# Patient Record
(demographics unavailable — no encounter records)

---

## 2024-10-08 NOTE — PROCEDURE
[de-identified] : Acupuncture treatment: Baldry technique with multiple needle placement to the lumbar paraspinal and gluteal musculature with UV lamp x 60 minutes Paralumbar chain (bladder meridian) with ES x 60 minutes K3-Ht 3 SI 4 BL 60 with ES x 60 minutes BL 10, SP 6, ST 36, LI 3, GB 21, LR 4 BL 59 Patient tolerated procedure well.

## 2024-11-13 NOTE — HISTORY OF PRESENT ILLNESS
[FreeTextEntry1] : Patient reports that he continues to experience cervical spine pain as well as lumbar pain.  He reports cervical spine pain is primarily right-sided.  He continues to experience radicular symptoms involving his left lower extremity.  Patient informs me he is scheduled to undergo a radiofrequency ablation involving his right cervical facets next week.  Electrodiagnostic studies of the lower extremities have been performed.  Patient also reports that he continues to find acupuncture treatments beneficial in providing some degree of pain relief and allows for decreased use of pharmaceutical agents.  He reports using Aleve as needed. [Has the patient missed work because of the injury/illness?] : The patient has missed work because of the injury/illness. [Yes] : The patient is currently working.

## 2024-11-13 NOTE — ASSESSMENT
[FreeTextEntry1] : Patient recently received authorization for acupuncture to the C/S and L/S Follow-up with pain management for right cervical facet ablation Discussed the importance of home exercises program, including but not limited to neck and low back range of motion, stretching and strengthening exercises and core strengthening. Patient was educated on their diagnosis today. All questions answered and patient expressed understanding.  F/U PM Recheck 4 to 6 weeks.   At least 20 minutes was spent with patient face to face examining patient, reviewing findings/results, counseling patient and coordinating treatment program. Ample time was provided to answer any questions or address concerns to the patient's satisfaction.  [Indicate if, in your opinion, the incident that the patient described was the competent medical cause of this injury/illness.] : The incident that the patient described was the competent medical cause of this injury/illness: Yes [Indicate if the patient's complaints are consistent with his/her history of the injury/illness.] : Indicate if the patient's complaints are consistent with his/her history of the injury/illness: Yes [Yes] : Yes, it is consistent [Can the patient return to usual work activities as indicated? If yes, indicate date___] : The patient can return to usual work activities on [unfilled] [FreeTextEntry5] : 75

## 2024-11-13 NOTE — PHYSICAL EXAM
[FreeTextEntry1] : Examination of the Lumbar Spine  MMT L/E: Intact SLR positive 40 degrees on the right, SLR positive 50 degrees on the left Reflexes 2 and symmetrical   Flexion: 45 degrees Extension: 15 degrees Lateral Bend R 25 degrees  L 30 degrees  Palpation lumbar spine: Tenderness and ,moderate spasm involving the bilateral lower lumbar paraspinal musculature trigger points gluteal musculature   Examination of the Cervical Spine  Flexion 40 degrees Extension 15 degrees  Rotation R 50 degrees    L 55 degrees Lateral Bend R  20 degrees L 30 degrees  MMT grossly intact  Reflexes: uppers 2 and symmetrical.   Palpation of the cervical Spine: tenderness and spasm bilateral trapezii and cervical paraspinal musculature with greater involvement on the right tenderness involving the right C4-C5 and C6 cervical facet region. [Normal] : The posterior cervical, anterior cervical, supraclavicular, axillary, femoral and inguinal nodes were non-tender and normal size

## 2025-01-09 NOTE — PROCEDURE
[de-identified] : Acupuncture treatment: Baldry technique with multiple needle placement to the cervical paraspinal and parascapular musculature with UV lamp x 60 minutes Cervical chain (bladder meridian) with ES x 60 minutes K3-Ht 3 SI 4 BL 60 with ES x 60 minutes BL 10, SP 6, ST 36, LI 3, GB 21, LR 4 BL 59 Patient tolerated procedure well.

## 2025-01-17 NOTE — PROCEDURE
[de-identified] : Acupuncture treatment: Baldry technique with multiple needle placement to the cervical paraspinal and parascapular musculature with UV lamp x 60 minutes Cervical chain (bladder meridian) with ES x 60 minutes K3-Ht 3 SI 4 BL 60 with ES x 60 minutes BL 10, SP 6, ST 36, LI 3, GB 21, LR 4 BL 59 Patient tolerated procedure well.

## 2025-01-30 NOTE — PROCEDURE
[de-identified] : Acupuncture treatment: Baldry technique with multiple needle placement to the cervical paraspinal and parascapular musculature with UV lamp x 60 minutes Cervical chain (bladder meridian) with ES x 60 minutes K3-Ht 3 SI 4 BL 60 with ES x 60 minutes BL 10, SP 6, ST 36, LI 3, GB 21, LR 4 BL 59 Patient tolerated procedure well.

## 2025-02-05 NOTE — HISTORY OF PRESENT ILLNESS
[FreeTextEntry1] : Patient continues to complain of C-spine pain with intermittent radicular symptoms involving his bilateral upper extremities he continues to report low back pain with intermittent radicular symptoms involving his left lower extremity.  Patient informs of that he is scheduled to undergo MMB injections to his lumbar spine next week.  Electrodiagnostic studies were reviewed with the patient.  Patient reports he continues to take  naproxen and Tylenol as per pain management [Has the patient missed work because of the injury/illness?] : The patient has missed work because of the injury/illness. [Yes] : The patient is currently working.

## 2025-02-05 NOTE — PHYSICAL EXAM
[FreeTextEntry1] : Examination of the Lumbar Spine  MMT L/E: Intact SLR positive 40 degrees on the right, SLR positive 40 degrees on the left Reflexes 2 and symmetrical   Flexion: 50 degrees Extension: 15 degrees Lateral Bend R 25 degrees  L 30 degrees  Palpation lumbar spine: Tenderness and, moderate spasm involving the bilateral lower lumbar paraspinal musculature trigger points gluteal musculature  Examination of the Cervical Spine  Flexion 40 degrees Extension 20 degrees  Rotation R 50 degrees    L 60 degrees Lateral Bend R  25 degrees L 30 degrees  MMT grossly intact  Reflexes: uppers 2 and symmetrical.   Palpation of the cervical Spine: tenderness and spasm bilateral trapezii and cervical paraspinal musculature. tenderness involving the right C4-C5 and C6 cervical facet region. [Normal] : The posterior cervical, anterior cervical, supraclavicular, axillary, femoral and inguinal nodes were non-tender and normal size

## 2025-02-06 NOTE — PROCEDURE
[de-identified] : Acupuncture treatment: Baldry technique with multiple needle placement to the cervical paraspinal and parascapular musculature with UV lamp x 60 minutes Cervical chain (bladder meridian) with ES x 60 minutes K3-Ht 3 SI 4 BL 60 with ES x 60 minutes BL 10, SP 6, ST 36, LI 3, GB 21, LR 4 BL 59 Patient tolerated procedure well.

## 2025-02-21 NOTE — PROCEDURE
[de-identified] : Acupuncture treatment: Baldry technique with multiple needle placement to the cervical paraspinal and parascapular musculature with UV lamp x 60 minutes Cervical chain (bladder meridian) with ES x 60 minutes K3-Ht 3 SI 4 BL 60 with ES x 60 minutes BL 10, SP 6, ST 36, LI 3, GB 21, LR 4 BL 59 Patient tolerated procedure well.

## 2025-02-25 NOTE — PROCEDURE
[de-identified] : Acupuncture treatment: Baldry technique with multiple needle placement to the cervical paraspinal and parascapular musculature with UV lamp x 60 minutes Cervical chain (bladder meridian) with ES x 60 minutes K3-Ht 3 SI 4 BL 60 with ES x 60 minutes BL 10, SP 6, ST 36, LI 3, GB 21, LR 4 BL 59 Patient tolerated procedure well.

## 2025-03-03 NOTE — PHYSICAL EXAM
[FreeTextEntry1] : Examination of the Lumbar Spine  MMT L/E: Intact SLR positive 50 degrees on the right, SLR positive 30 degrees on the left Reflexes 2 and symmetrical   Flexion: 50 degrees Extension: 20 degrees Lateral Bend R 25 degrees  L 25 degrees  Palpation lumbar spine: Tenderness and, moderate spasm involving the bilateral lower lumbar paraspinal musculature trigger points gluteal musculature  Examination of the Cervical Spine  Flexion 40 degrees Extension 25 degrees  Rotation R 55 degrees    L 60 degrees Lateral Bend R  25 degrees L 30 degrees  MMT grossly intact  Reflexes: uppers 2 and symmetrical.   Palpation of the cervical Spine: Tenderness and spasm involving the bilateral cervical paraspinal musculature trigger points involving bilateral trapezii. [Normal] : The posterior cervical, anterior cervical, supraclavicular, axillary, femoral and inguinal nodes were non-tender and normal size

## 2025-03-03 NOTE — HISTORY OF PRESENT ILLNESS
[FreeTextEntry1] : Patient reports he continues to experience C-spine pain with intermittent radicular symptoms involving bilateral upper extremities as well as low back pain with intermittent radicular symptoms involving his left lower extremity.  Patient reports he underwent a MMB injection to the bilateral lower lumbar paraspinal musculature approximately 3 weeks ago which provided some temporary relief.  He informs me that he is scheduled for follow-up with pain management to discuss possible radiofrequency ablation.  Patient reports he continues to find acupuncture treatments beneficial in terms of alleviating pain and dissipating muscle spasm presents today for reevaluation. [Has the patient missed work because of the injury/illness?] : The patient has missed work because of the injury/illness. [Yes] : The patient is currently working.

## 2025-03-03 NOTE — ASSESSMENT
[FreeTextEntry1] : Continue with acupuncture to the C/S and L/S Follow-up with pain management  Discussed the importance of home exercises program, including but not limited to neck and low back range of motion, stretching and strengthening exercises and core strengthening. Patient was educated on their diagnosis today. All questions answered and patient expressed understanding.  Reviewed home exercise program with patient.  Stressed the importance of cervical spine range of motion trap and rhomboid stretching scapular mobilization isometrics to the C-spine, proper posturing and body mechanics as well as ergonomics  Recheck 4 to 6 weeks.   At least 20 minutes was spent with patient face to face examining patient, reviewing findings/results, counseling patient and coordinating treatment program. Ample time was provided to answer any questions or address concerns to the patient's satisfaction.  [Indicate if, in your opinion, the incident that the patient described was the competent medical cause of this injury/illness.] : The incident that the patient described was the competent medical cause of this injury/illness: Yes [Indicate if the patient's complaints are consistent with his/her history of the injury/illness.] : Indicate if the patient's complaints are consistent with his/her history of the injury/illness: Yes [Yes] : Yes, it is consistent [Can the patient return to usual work activities as indicated? If yes, indicate date___] : The patient can return to usual work activities on [unfilled] [FreeTextEntry5] : 75

## 2025-03-14 NOTE — PROCEDURE
[de-identified] : Acupuncture treatment: Baldry technique with multiple needle placement to the cervical paraspinal and parascapular musculature with UV lamp x 60 minutes Cervical chain (bladder meridian) with ES x 60 minutes K3-Ht 3 SI 4 BL 60 with ES x 60 minutes BL 10, SP 6, ST 36, LI 3, GB 21, LR 4 BL 59 Patient tolerated procedure well.

## 2025-03-14 NOTE — PROCEDURE
[de-identified] : Acupuncture treatment: Baldry technique with multiple needle placement to the cervical paraspinal and parascapular musculature with UV lamp x 60 minutes Cervical chain (bladder meridian) with ES x 60 minutes K3-Ht 3 SI 4 BL 60 with ES x 60 minutes BL 10, SP 6, ST 36, LI 3, GB 21, LR 4 BL 59 Patient tolerated procedure well.

## 2025-03-28 NOTE — PROCEDURE
[de-identified] : Acupuncture treatment: Baldry technique with multiple needle placement to the cervical paraspinal and parascapular musculature with UV lamp x 60 minutes Cervical chain (bladder meridian) with ES x 60 minutes K3-Ht 3 SI 4 BL 60 with ES x 60 minutes BL 10, SP 6, ST 36, LI 3, GB 21, LR 4 BL 59 Patient tolerated procedure well.

## 2025-04-16 NOTE — HISTORY OF PRESENT ILLNESS
[FreeTextEntry1] : Patient reports he continues to experience C-spine pain with intermittent radicular symptoms involving bilateral upper extremities as well as low back pain with intermittent radicular symptoms involving his left lower extremity.  Patient reports undergoing a series of lumbar MMB injections involving the right and left side of his low back 1 approximately 2 weeks ago the second 4 weeks ago.  He reports some improvement after injections.  He reports he continues to find acupuncture treatments beneficial in terms of decreasing pain dissipating muscle spasm and improving level of function.  Patient would like to continue with acupuncture treatments. [Has the patient missed work because of the injury/illness?] : The patient has missed work because of the injury/illness. [Yes] : The patient is currently working.

## 2025-04-16 NOTE — PHYSICAL EXAM
[FreeTextEntry1] : Examination of the Lumbar Spine  MMT L/E: Intact SLR positive 50 degrees on the right, SLR positive 40 degrees on the left Reflexes 2 and symmetrical   Flexion: 55 degrees Extension: 20 degrees Lateral Bend R 25 degrees  L 25 degrees  Palpation lumbar spine: Tenderness and, moderate spasm involving the bilateral lower lumbar paraspinal musculature trigger points gluteal musculature  Examination of the Cervical Spine  Flexion 40 degrees Extension 25 degrees  Rotation R 50 degrees    L 60 degrees Lateral Bend R  25 degrees L 30 degrees  MMT grossly intact  Reflexes: uppers 2 and symmetrical.   Palpation of the cervical Spine: Tenderness and spasm involving the bilateral cervical paraspinal musculature trigger points involving bilateral trapezii. [Normal] : The posterior cervical, anterior cervical, supraclavicular, axillary, femoral and inguinal nodes were non-tender and normal size

## 2025-04-16 NOTE — ASSESSMENT
[FreeTextEntry1] : Request 8 sessions acupuncture to the C/S and 8 sessions L/S Goals of which are to decrease pain, dissipate muscle spasm, increase ROM and improve level of function. Follow-up with pain management  Discussed the importance of home exercises program, including but not limited to neck and low back range of motion, stretching and strengthening exercises and core strengthening. Patient was educated on their diagnosis today. All questions answered and patient expressed understanding.  Reviewed home exercise program with patient.  Stressed the importance of cervical spine range of motion trap and rhomboid stretching scapular mobilization isometrics to the C-spine, proper posturing and body mechanics as well as ergonomics  Recheck 4 to 6 weeks.   At least 20 minutes was spent with patient face to face examining patient, reviewing findings/results, counseling patient and coordinating treatment program. Ample time was provided to answer any questions or address concerns to the patient's satisfaction.  [Indicate if, in your opinion, the incident that the patient described was the competent medical cause of this injury/illness.] : The incident that the patient described was the competent medical cause of this injury/illness: Yes [Indicate if the patient's complaints are consistent with his/her history of the injury/illness.] : Indicate if the patient's complaints are consistent with his/her history of the injury/illness: Yes [Yes] : Yes, it is consistent [FreeTextEntry5] : 75

## 2025-05-22 NOTE — HISTORY OF PRESENT ILLNESS
[Has the patient missed work because of the injury/illness?] : The patient has missed work because of the injury/illness. [Yes] : The patient is currently working. [FreeTextEntry1] : Patient continues to complain of CS and LS pain with intermittent radicular symptoms involving bilateral upper extremities as well as left lower extremity.  Patient informs me that he has follow-up with pain management and is awaiting authorization for a cervical MMB injection.  Patient reports he continues to find acupuncture treatments beneficial in terms of decreasing pain and dissipating muscle spasm as well as the decreased dependence on pharmaceutical agents.  Presents today for reevaluation

## 2025-05-22 NOTE — ASSESSMENT
[Indicate if, in your opinion, the incident that the patient described was the competent medical cause of this injury/illness.] : The incident that the patient described was the competent medical cause of this injury/illness: Yes [Indicate if the patient's complaints are consistent with his/her history of the injury/illness.] : Indicate if the patient's complaints are consistent with his/her history of the injury/illness: Yes [Yes] : Yes, it is consistent [FreeTextEntry1] : Patiently recently received authorization to resume acupuncture treatment to the CS and LS Goals of which are to decrease pain, dissipate muscle spasm, increase ROM and improve level of function. Follow-up with pain management  Discussed the importance of home exercises program, including but not limited to neck and low back range of motion, stretching and strengthening exercises and core strengthening. Patient was educated on their diagnosis today. All questions answered and patient expressed understanding.  Reviewed home exercise program with patient.  Stressed the importance of cervical spine range of motion trap and rhomboid stretching scapular mobilization isometrics to the C-spine, proper posturing and body mechanics as well as ergonomics  Recheck 6 weeks.   At least 20 minutes was spent with patient face to face examining patient, reviewing findings/results, counseling patient and coordinating treatment program. Ample time was provided to answer any questions or address concerns to the patient's satisfaction.  [FreeTextEntry5] : 75

## 2025-05-22 NOTE — PHYSICAL EXAM
[Normal] : The posterior cervical, anterior cervical, supraclavicular, axillary, femoral and inguinal nodes were non-tender and normal size [FreeTextEntry1] : Examination of the Lumbar Spine  MMT L/E: Intact SLR positive 50 degrees on the right, SLR positive 50 degrees on the left Reflexes 2 and symmetrical   Flexion: 50 degrees Extension: 20 degrees Lateral Bend R 25 degrees L 30 degrees  Palpation lumbar spine: Tenderness and, moderate spasm involving the bilateral lower lumbar paraspinal musculature trigger points gluteal musculature  Examination of the Cervical Spine  Flexion 40 degrees Extension 25 degrees  Rotation R 55 degrees    L 60 degrees Lateral Bend R  25 degrees L 30 degrees  MMT grossly intact  Reflexes: uppers 2 and symmetrical.   Palpation of the cervical Spine: Tenderness and spasm involving the bilateral cervical paraspinal musculature trigger points involving bilateral trapezii.

## 2025-06-11 NOTE — HISTORY OF PRESENT ILLNESS
[Has the patient missed work because of the injury/illness?] : The patient has missed work because of the injury/illness. [Yes] : The patient is currently working. [FreeTextEntry1] : Patient continues to complain of CS and LS pain with intermittent radicular symptoms involving bilateral upper extremities as well as left lower extremity.   Patient reports cervical spine pain is aggravated with extremes of rotation and hyperextension as well as any repetitive over shoulder motions.  Low back pain is aggravated with prolonged sitting standing and or any heavy pushing pulling and/or repetitive bending activities.  Patient presents today for C4.3 evaluation

## 2025-06-11 NOTE — ASSESSMENT
[Indicate if, in your opinion, the incident that the patient described was the competent medical cause of this injury/illness.] : The incident that the patient described was the competent medical cause of this injury/illness: Yes [Indicate if the patient's complaints are consistent with his/her history of the injury/illness.] : Indicate if the patient's complaints are consistent with his/her history of the injury/illness: Yes [Yes] : Yes, it is consistent [FreeTextEntry1] : C4.3 L/S  Table 11.1 Class 4 Severity ranking F MRI L/S- 16 points  SLR- 4 points  EMG- 6 points Sensation- 8 points  Total 34 points   C4.3 - C/S Table 11.1 Class 4 Severity ranking E MRI-16 points  EMG- 6 points Spurling -4 points  Sensation - 6 points Total points 32 points   Recheck 6 weeks.   [FreeTextEntry5] : 75

## 2025-06-11 NOTE — PHYSICAL EXAM
[Normal] : The posterior cervical, anterior cervical, supraclavicular, axillary, femoral and inguinal nodes were non-tender and normal size [FreeTextEntry1] : Examination of the Cervical Spine  Flexion 40 degrees Extension 20 degrees  Rotation R 50 degrees    L 60 degrees Lateral Bend R  25 degrees L 30 degrees  MMT grossly intact  Spurling +  Reflexes: uppers 2 and symmetrical.  Sensation decrease right C6 dermatome distribution Palpation of the cervical Spine: Tenderness and spasm involving the bilateral cervical paraspinal musculature trigger points involving bilateral trapezii.  Examination of the Lumbar Spine  MMT L/E: Intact SLR positive 50 degrees on the right, SLR positive 40 degrees on the left Reflexes 2 and symmetrical  Sensation decreased left L4/L5 dermatome distribution Flexion: 50 degrees Extension: 15 degrees Lateral Bend R 30 degrees L 30 degrees  Palpation lumbar spine: Tenderness and, moderate spasm involving the bilateral lower lumbar paraspinal musculature trigger points gluteal musculature

## 2025-07-10 NOTE — PROCEDURE
[de-identified] : Acupuncture treatment: Baldry technique with multiple needle placement to the cervical paraspinal and parascapular musculature with UV lamp x 60 minutes Cervical chain (bladder meridian) with ES x 60 minutes K3-Ht 3 SI 4 BL 60 with ES x 60 minutes BL 10, SP 6, ST 36, LI 3, GB 21, LR 4 BL 59 Patient tolerated procedure well.  [Normal] : soft, non-tender, non-distended, no masses palpated, no HSM and normal bowel sounds

## 2025-07-17 NOTE — HISTORY OF PRESENT ILLNESS
[Has the patient missed work because of the injury/illness?] : The patient has missed work because of the injury/illness. [Yes] : The patient is currently working. [FreeTextEntry1] : Patient continues to complain of CS and LS pain with intermittent radicular symptoms involving bilateral upper extremities as well as left lower extremity.  Patient reports that over the past several months he has undergone RFA procedures to the right and left cervical spine as well as the right and left lumbar spine.  As well as a right SI joint injection.  Patient reports he continues to find acupuncture treatments beneficial in terms of decreasing pain and dissipating muscle spasm as well as the decreased dependence on pharmaceutical agents.  Presents today for reevaluation

## 2025-07-17 NOTE — ASSESSMENT
[Indicate if, in your opinion, the incident that the patient described was the competent medical cause of this injury/illness.] : The incident that the patient described was the competent medical cause of this injury/illness: Yes [Indicate if the patient's complaints are consistent with his/her history of the injury/illness.] : Indicate if the patient's complaints are consistent with his/her history of the injury/illness: Yes [Yes] : Yes, it is consistent [FreeTextEntry1] : Continue with acupuncture to the CS and LS 1 time weekly x 6 weeks Goals of which are to decrease pain, dissipate muscle spasm, increase ROM and improve level of function. Follow-up with pain management  Discussed the importance of home exercises program, including but not limited to neck and low back range of motion, stretching and strengthening exercises and core strengthening. Patient was educated on their diagnosis today. All questions answered and patient expressed understanding.  Reviewed home exercise program with patient.  Stressed the importance of cervical spine range of motion trap and rhomboid stretching scapular mobilization isometrics to the C-spine, proper posturing and body mechanics as well as ergonomics Follow-up with pain management Recheck 6 weeks.   At least 20 minutes was spent with patient face to face examining patient, reviewing findings/results, counseling patient and coordinating treatment program. Ample time was provided to answer any questions or address concerns to the patient's satisfaction.  [FreeTextEntry5] : 75

## 2025-07-17 NOTE — PHYSICAL EXAM
[Normal] : The posterior cervical, anterior cervical, supraclavicular, axillary, femoral and inguinal nodes were non-tender and normal size [FreeTextEntry1] : Examination of the Cervical Spine Flexion 40 degrees Extension 20 degrees Rotation R 50 degrees L 55 degrees Lateral Bend R 25 degrees L 30 degrees MMT grossly intact Spurling + Reflexes: uppers 2 and symmetrical. Sensation decrease right C6 dermatome distribution Palpation of the cervical Spine: Tenderness and spasm involving the bilateral cervical paraspinal musculature trigger points involving bilateral trapezii.  Examination of the Lumbar Spine MMT L/E: Intact SLR positive 30 degrees on the right, SLR positive 40 degrees on the left Reflexes 2 and symmetrical Sensation decreased left L4/L5 dermatome distribution Flexion: 45 degrees Extension: 15 degrees Lateral Bend R 30 degrees L 30 degrees Palpation lumbar spine: Tenderness and, moderate spasm involving the bilateral lower lumbar paraspinal musculature trigger points gluteal musculature.

## 2025-07-22 NOTE — PROCEDURE
[de-identified] : Acupuncture treatment: Baldry technique with multiple needle placement to the cervical paraspinal and parascapular musculature with UV lamp x 60 minutes Cervical chain (bladder meridian) with ES x 60 minutes K3-Ht 3 SI 4 BL 60 with ES x 60 minutes BL 10, SP 6, ST 36, LI 3, GB 21, LR 4 BL 59 Patient tolerated procedure well.

## 2025-07-22 NOTE — PROCEDURE
[de-identified] : Acupuncture treatment: Baldry technique with multiple needle placement to the cervical paraspinal and parascapular musculature with UV lamp x 60 minutes Cervical chain (bladder meridian) with ES x 60 minutes K3-Ht 3 SI 4 BL 60 with ES x 60 minutes BL 10, SP 6, ST 36, LI 3, GB 21, LR 4 BL 59 Patient tolerated procedure well.

## 2025-07-29 NOTE — PROCEDURE
[de-identified] : Acupuncture treatment: Baldry technique with multiple needle placement to the cervical paraspinal and parascapular musculature with UV lamp x 60 minutes Cervical chain (bladder meridian) with ES x 60 minutes K3-Ht 3 SI 4 BL 60 with ES x 60 minutes BL 10, SP 6, ST 36, LI 3, GB 21, LR 4 BL 59 With needle reinsertion Patient tolerated procedure well.